# Patient Record
Sex: FEMALE | Race: WHITE | Employment: OTHER | URBAN - NONMETROPOLITAN AREA
[De-identification: names, ages, dates, MRNs, and addresses within clinical notes are randomized per-mention and may not be internally consistent; named-entity substitution may affect disease eponyms.]

---

## 2021-01-27 ENCOUNTER — TELEPHONE (OUTPATIENT)
Dept: FAMILY MEDICINE CLINIC | Facility: CLINIC | Age: 76
End: 2021-01-27

## 2021-01-28 ENCOUNTER — TELEPHONE (OUTPATIENT)
Dept: FAMILY MEDICINE CLINIC | Facility: CLINIC | Age: 76
End: 2021-01-28

## 2021-01-28 NOTE — TELEPHONE ENCOUNTER
Pt son is inquiring about PCS for Ambulance services for pt's appt tomorrow. Pt has been bedridden since Sept. Please call, thanks!

## 2021-01-29 ENCOUNTER — OFFICE VISIT (OUTPATIENT)
Dept: FAMILY MEDICINE CLINIC | Facility: CLINIC | Age: 76
End: 2021-01-29

## 2021-01-29 VITALS
HEART RATE: 104 BPM | OXYGEN SATURATION: 97 % | SYSTOLIC BLOOD PRESSURE: 126 MMHG | DIASTOLIC BLOOD PRESSURE: 84 MMHG | TEMPERATURE: 98.6 F

## 2021-01-29 DIAGNOSIS — M54.50 CHRONIC BILATERAL LOW BACK PAIN WITHOUT SCIATICA: ICD-10-CM

## 2021-01-29 DIAGNOSIS — F41.9 ANXIETY AND DEPRESSION: ICD-10-CM

## 2021-01-29 DIAGNOSIS — G89.29 CHRONIC BILATERAL LOW BACK PAIN WITHOUT SCIATICA: ICD-10-CM

## 2021-01-29 DIAGNOSIS — Z76.89 ENCOUNTER TO ESTABLISH CARE WITH NEW DOCTOR: Primary | ICD-10-CM

## 2021-01-29 DIAGNOSIS — Z74.01 BEDRIDDEN: ICD-10-CM

## 2021-01-29 DIAGNOSIS — F32.A ANXIETY AND DEPRESSION: ICD-10-CM

## 2021-01-29 DIAGNOSIS — G43.709 CHRONIC MIGRAINE WITHOUT AURA WITHOUT STATUS MIGRAINOSUS, NOT INTRACTABLE: ICD-10-CM

## 2021-01-29 DIAGNOSIS — F41.1 ANXIETY STATE: ICD-10-CM

## 2021-01-29 PROBLEM — G43.909 MIGRAINES: Status: ACTIVE | Noted: 2020-09-12

## 2021-01-29 PROBLEM — K21.9 GERD (GASTROESOPHAGEAL REFLUX DISEASE): Status: ACTIVE | Noted: 2020-09-12

## 2021-01-29 PROBLEM — N39.0 URINARY TRACT INFECTION WITHOUT HEMATURIA: Status: ACTIVE | Noted: 2020-09-12

## 2021-01-29 PROBLEM — R78.81 BACTEREMIA: Status: ACTIVE | Noted: 2020-09-15

## 2021-01-29 PROBLEM — I26.99 BILATERAL PULMONARY EMBOLISM (HCC): Status: ACTIVE | Noted: 2020-09-12

## 2021-01-29 PROCEDURE — 99204 OFFICE O/P NEW MOD 45 MIN: CPT | Performed by: FAMILY MEDICINE

## 2021-01-29 RX ORDER — TRAMADOL HYDROCHLORIDE 50 MG/1
50 TABLET ORAL EVERY 8 HOURS PRN
Qty: 30 TABLET | Refills: 0 | Status: SHIPPED | OUTPATIENT
Start: 2021-01-29

## 2021-01-29 RX ORDER — RIZATRIPTAN BENZOATE 10 MG/1
10 TABLET, ORALLY DISINTEGRATING ORAL TAKE AS DIRECTED
Qty: 12 TABLET | Refills: 2 | Status: SHIPPED | OUTPATIENT
Start: 2021-01-29 | End: 2021-04-15

## 2021-02-01 ENCOUNTER — TELEPHONE (OUTPATIENT)
Dept: FAMILY MEDICINE CLINIC | Facility: CLINIC | Age: 76
End: 2021-02-01

## 2021-02-09 DIAGNOSIS — F32.A ANXIETY AND DEPRESSION: Primary | ICD-10-CM

## 2021-02-09 DIAGNOSIS — F41.9 ANXIETY AND DEPRESSION: Primary | ICD-10-CM

## 2021-02-09 RX ORDER — HYDROXYZINE PAMOATE 25 MG/1
25 CAPSULE ORAL DAILY
Qty: 30 CAPSULE | Refills: 2 | Status: SHIPPED | OUTPATIENT
Start: 2021-02-09 | End: 2021-08-19

## 2021-02-09 RX ORDER — ALPRAZOLAM 1 MG/1
1 TABLET ORAL 2 TIMES DAILY PRN
Qty: 60 TABLET | Refills: 0 | Status: SHIPPED | OUTPATIENT
Start: 2021-02-09 | End: 2021-04-01 | Stop reason: SDUPTHER

## 2021-02-09 NOTE — TELEPHONE ENCOUNTER
Last OV   1/29/21    Next Max OV    2/26/2021    Last Script Written 1/9/21      Last Ramón       Please advise on refill    Thank you

## 2021-02-19 ENCOUNTER — TELEPHONE (OUTPATIENT)
Dept: FAMILY MEDICINE CLINIC | Facility: CLINIC | Age: 76
End: 2021-02-19

## 2021-02-20 NOTE — PROGRESS NOTES
Chief Complaint  Establish Care and Back Pain    Subjective          Kellen Hernandez presents to [facility]  Patient is a pleasant 75-year-old  female previously diagnosed with chronic migraines, chronic low back pain and left leg pain that has been bedridden since late last year, GERD, depression, anxiety, history of peptic ulcer disease, history of DVT, history of pulmonary emboli, cataracts, irritable bowel syndrome, and osteoarthritis that presents today to establish care and discuss migraines, back pain, and depression/anxiety.  Patient was transferred to [facility], [place] but definitely due to pain in her back and leg following multiple fracture fracture of her left leg.  She is currently working with physical therapy at home to work on hopefully getting back to ambulation.    Migraine   This is a chronic problem. The current episode started more than 1 year ago. The problem occurs intermittently. The problem has been waxing and waning. The pain is located in the parietal and temporal region. The pain does not radiate. The pain quality is similar to prior headaches. The quality of the pain is described as aching, pulsating, stabbing and throbbing. The pain is at a severity of 10/10. The pain is severe. Associated symptoms include back pain, blurred vision, phonophobia, photophobia, a visual change and weakness. Pertinent negatives include no abdominal pain, abnormal behavior, anorexia, coughing, dizziness, drainage, ear pain, eye pain, eye redness, eye watering, facial sweating, fever, hearing loss, insomnia, loss of balance, muscle aches, nausea, neck pain, numbness, rhinorrhea, scalp tenderness, seizures, sinus pressure, sore throat, swollen glands, tingling, tinnitus, vomiting or weight loss. The symptoms are aggravated by bright light and emotional stress. She has tried triptans, NSAIDs and darkened room (depakene, alprazolam ) for the symptoms. The treatment provided moderate  relief. Her past medical history is significant for migraine headaches.   Back Pain  This is a chronic problem. The current episode started more than 1 year ago. The problem occurs constantly. The problem has been waxing and waning since onset. The pain is present in the lumbar spine, sacro-iliac and gluteal. The quality of the pain is described as aching, shooting and stabbing. The pain does not radiate. The pain is at a severity of 7/10. The pain is severe. The pain is the same all the time. The symptoms are aggravated by position. Associated symptoms include headaches, leg pain and weakness. Pertinent negatives include no abdominal pain, bladder incontinence, bowel incontinence, chest pain, dysuria, fever, numbness, paresis, paresthesias, pelvic pain, perianal numbness, tingling or weight loss. Risk factors include lack of exercise and sedentary lifestyle. She has tried analgesics, NSAIDs, ice, heat and bed rest for the symptoms. The treatment provided moderate relief.   Depression  Visit Type: initial  Onset of symptoms: more than 1 year ago  Progression since onset: unchanged  Patient presents with the following symptoms: anhedonia, depressed mood, fatigue, feelings of hopelessness, feelings of worthlessness, nervousness/anxiety and panic.  Patient is not experiencing: chest pain, choking sensation, compulsions, confusion, decreased concentration, dizziness, dry mouth, excessive worry, hypersomnia, hyperventilation, impotence, insomnia, irritability, malaise, memory impairment, muscle tension, nausea, obsessions, palpitations, psychomotor agitation, psychomotor retardation, restlessness, shortness of breath, suicidal ideas, suicidal planning, thoughts of death, weight gain and weight loss.  Frequency of symptoms: most days   Severity: moderate   Exacerbated by: health issues   Patient has a history of: anxiety/panic attacks and depression  No history of: anemia, arrhythmia, asthma, bipolar disorder, CAD, CHF,  chronic lung disease, fibromyalgia, hyperthyroidism, mental illness and substance abuse  Treatment tried: SSRI, non-benzodiazephine anxiolytics, benzodiazepine and non-SSRI antidepressants  Compliance with treatment: variable  Improvement on treatment: moderate  Past compliance problems: medical issues and medication issues      Anxiety  Presents for initial visit. Onset was more than 5 years ago. The problem has been unchanged. Symptoms include depressed mood, nervous/anxious behavior and panic. Patient reports no chest pain, compulsions, confusion, decreased concentration, dizziness, dry mouth, excessive worry, feeling of choking, hyperventilation, impotence, insomnia, irritability, malaise, muscle tension, nausea, obsessions, palpitations, restlessness, shortness of breath or suicidal ideas. Symptoms occur most days. The severity of symptoms is moderate.     Her past medical history is significant for anxiety/panic attacks and depression. There is no history of arrhythmia, asthma, CAD, chronic lung disease or hyperthyroidism. Past treatments include benzodiazephines, non-SSRI antidepressants, SSRIs and non-benzodiazephine anxiolytics. The treatment provided moderate relief. Compliance with prior treatments has been variable. Prior compliance problems include medical issues.       Review of Systems   Constitutional: Negative for activity change, appetite change, fatigue, fever, irritability, unexpected weight gain and unexpected weight loss.   HENT: Negative for congestion, dental problem, ear pain, hearing loss, rhinorrhea, sinus pressure, sore throat, swollen glands, tinnitus, trouble swallowing and voice change.    Eyes: Positive for blurred vision and photophobia. Negative for pain, redness and visual disturbance.   Respiratory: Negative for cough, choking, chest tightness, shortness of breath and wheezing.    Cardiovascular: Negative for chest pain, palpitations and leg swelling.   Gastrointestinal: Negative  for abdominal distention, abdominal pain, anorexia, blood in stool, bowel incontinence, constipation, diarrhea, nausea and vomiting.   Endocrine: Negative for cold intolerance and heat intolerance.   Genitourinary: Negative for difficulty urinating, dysuria, flank pain, frequency, hematuria, impotence, pelvic pain, urgency, urinary incontinence and vaginal discharge.   Musculoskeletal: Positive for back pain. Negative for arthralgias, gait problem, joint swelling, myalgias and neck pain.   Skin: Negative for rash.   Neurological: Positive for weakness. Negative for dizziness, tingling, seizures, light-headedness, numbness, paresthesias, loss of balance and confusion.   Hematological: Negative for adenopathy. Does not bruise/bleed easily.   Psychiatric/Behavioral: Positive for depressed mood. Negative for agitation, behavioral problems, decreased concentration, dysphoric mood, hallucinations, self-injury, sleep disturbance, substance abuse and suicidal ideas. The patient is nervous/anxious. The patient does not have insomnia and is not hyperactive.      Objective   Vital Signs:   /84   Pulse 104   Temp 98.6 °F (37 °C)   SpO2 97%     Physical Exam  Vitals signs and nursing note reviewed.   Constitutional:       General: She is awake. She is not in acute distress.     Appearance: Normal appearance. She is well-developed, well-groomed and normal weight. She is not ill-appearing, toxic-appearing or diaphoretic.      Interventions: She is not intubated.     Comments: On ambulance stretcher throughout exam.    HENT:      Head: Normocephalic and atraumatic.      Right Ear: Hearing and external ear normal.      Left Ear: Hearing and external ear normal.      Nose: Nose normal.   Eyes:      General: Lids are normal. No scleral icterus.        Right eye: No discharge.         Left eye: No discharge.      Extraocular Movements: Extraocular movements intact.      Right eye: Normal extraocular motion and no nystagmus.       Left eye: Normal extraocular motion and no nystagmus.      Conjunctiva/sclera: Conjunctivae normal.      Pupils: Pupils are equal, round, and reactive to light.   Neck:      Musculoskeletal: Full passive range of motion without pain, normal range of motion and neck supple. Normal range of motion. No edema, erythema or neck rigidity.      Thyroid: No thyroid mass or thyromegaly.      Trachea: Trachea and phonation normal. No tracheal tenderness or tracheal deviation.   Cardiovascular:      Rate and Rhythm: Normal rate and regular rhythm.  No extrasystoles are present.     Chest Wall: PMI is not displaced. No thrill.      Heart sounds: Normal heart sounds, S1 normal and S2 normal. Heart sounds not distant. No murmur. No systolic murmur. No diastolic murmur. No friction rub. No gallop. No S3 or S4 sounds.    Pulmonary:      Effort: Pulmonary effort is normal. No tachypnea, bradypnea, accessory muscle usage, prolonged expiration, respiratory distress or retractions. She is not intubated.      Breath sounds: Normal breath sounds and air entry. No stridor, decreased air movement or transmitted upper airway sounds. No decreased breath sounds, wheezing, rhonchi or rales.   Abdominal:      General: Abdomen is flat. Bowel sounds are normal. There is no distension or abdominal bruit. There are no signs of injury.      Palpations: Abdomen is soft. There is no shifting dullness, fluid wave, hepatomegaly, splenomegaly, mass or pulsatile mass.      Tenderness: There is no abdominal tenderness. There is no right CVA tenderness, left CVA tenderness, guarding or rebound. Negative signs include Chinchilla's sign and McBurney's sign.   Musculoskeletal:      Lumbar back: She exhibits decreased range of motion, tenderness and pain. She exhibits no bony tenderness, no swelling, no edema, no deformity, no laceration, no spasm and normal pulse.      Right lower leg: No edema.      Left lower leg: No edema.      Comments: Leg raise test  difficult to perform due to pain and decreased flexibility.    Lymphadenopathy:      Cervical: No cervical adenopathy.   Skin:     General: Skin is warm and dry.      Findings: No erythema or rash.   Neurological:      General: No focal deficit present.      Mental Status: She is alert and oriented to person, place, and time. Mental status is at baseline.      Cranial Nerves: Cranial nerves are intact. No cranial nerve deficit, dysarthria or facial asymmetry.      Motor: Weakness present. No tremor, atrophy, abnormal muscle tone or seizure activity.      Gait: Gait is intact.      Comments: Decreased strength b/l LE. Able to lift right leg but with some obvious effort. Unable to lift left leg against gravity. Pedal strength intact b/l.    Psychiatric:         Attention and Perception: Attention and perception normal. She is attentive. She does not perceive auditory or visual hallucinations.         Mood and Affect: Mood and affect normal. Mood is not anxious, depressed or elated. Affect is not labile, blunt, flat, angry, tearful or inappropriate.         Speech: Speech normal. She is communicative. Speech is not rapid and pressured, delayed, slurred or tangential.         Behavior: Behavior normal. Behavior is not agitated, slowed, aggressive, withdrawn, hyperactive or combative. Behavior is cooperative.         Thought Content: Thought content normal. Thought content is not paranoid or delusional. Thought content does not include homicidal or suicidal ideation. Thought content does not include homicidal or suicidal plan.         Cognition and Memory: Cognition and memory normal. Cognition is not impaired. Memory is not impaired. She does not exhibit impaired recent memory or impaired remote memory.         Judgment: Judgment normal. Judgment is not impulsive or inappropriate.        Result Review :                 Assessment and Plan    Diagnoses and all orders for this visit:    1. Encounter to establish care with  new doctor (Primary)  Complete medical, surgical, social, and family history reviewed.  Previous medical records requested.      2. Chronic migraine without aura without status migrainosus, not intractable  -     Ambulatory Referral to Neurology  We will continue current medications at this time.  Avoid aggravating factors.  Referral to neurology made at patient's request.    3. Chronic bilateral low back pain without sciatica  -     traMADol (ULTRAM) 50 MG tablet; Take 1 tablet by mouth Every 8 (Eight) Hours As Needed (Moderate/severe pain).  Dispense: 30 tablet; Refill: 0  We will continue tramadol at this time.  Avoid NSAIDs due to history of peptic ulcer disease.  May take Tylenol as well for mild to moderate pain.  Risks of tramadol including sedation, respiratory depression, abuse potential and dependency discussed.  Discussed avoiding this with alprazolam when possible or alcohol or other sedating medications or substances.  Patient verbalized understanding and would like to continue medication as it greatly improves quality of life and alleviate pain making it tolerable.  Ramón reviewed.  Controlled substance contract signed.  UDS will be ordered.  May need referral to pain management in future.  May need further imaging if they do not have this from previous records.  Continue work with physical therapy at this time.    4. Anxiety and depression  -     Ambulatory Referral to Behavioral Health  We will continue current medications at this time.  Discussed that I cannot write benzodiazepines regularly in the office and that she may need to discuss this with psychiatry.  We will continue these at this time until she sees behavioral health.  Risks including sedation, respiratory depression, abuse potential, and dependency discussed.  Patient verbalized understanding and would like to continue medication at this time due to significant relief of anxiety which is one of the triggers of her severe migraines.       5. Anxiety state  -     Ambulatory Referral to Behavioral Health    6. Bedridden  This significantly complicates patient's care as she is unable to get out of the bed for office visits or any ambulation at this time.  Continue to work on physical therapy at this time.    Other orders  -     rizatriptan MLT (MAXALT-MLT) 10 MG disintegrating tablet; Place 1 tablet on the tongue Take As Directed. 1 tablet at onset of headache repeat every 2 hours with a max of 3 per day  Dispense: 12 tablet; Refill: 2      I spent 45 minutes caring for Kellen on this date of service. This time includes time spent by me in the following activities:preparing for the visit, obtaining and/or reviewing a separately obtained history, performing a medically appropriate examination and/or evaluation , counseling and educating the patient/family/caregiver, ordering medications, tests, or procedures, referring and communicating with other health care professionals  and documenting information in the medical record  Follow Up   Return in about 4 weeks (around 2/26/2021) for Recheck, or sooner if any other problems.  Patient was given instructions and counseling regarding her condition or for health maintenance advice. Please see specific information pulled into the AVS if appropriate.

## 2021-02-23 ENCOUNTER — TELEPHONE (OUTPATIENT)
Dept: FAMILY MEDICINE CLINIC | Facility: CLINIC | Age: 76
End: 2021-02-23

## 2021-02-23 DIAGNOSIS — R19.7 DIARRHEA, UNSPECIFIED TYPE: Primary | ICD-10-CM

## 2021-02-23 RX ORDER — DIPHENOXYLATE HYDROCHLORIDE AND ATROPINE SULFATE 2.5; .025 MG/1; MG/1
1 TABLET ORAL 2 TIMES DAILY PRN
Qty: 20 TABLET | Refills: 0 | Status: SHIPPED | OUTPATIENT
Start: 2021-02-23 | End: 2021-07-16

## 2021-03-29 ENCOUNTER — TELEPHONE (OUTPATIENT)
Dept: FAMILY MEDICINE CLINIC | Facility: CLINIC | Age: 76
End: 2021-03-29

## 2021-03-30 DIAGNOSIS — L97.929 ULCERS OF BOTH LOWER EXTREMITIES, UNSPECIFIED ULCER STAGE (HCC): Primary | ICD-10-CM

## 2021-03-30 DIAGNOSIS — L97.919 ULCERS OF BOTH LOWER EXTREMITIES, UNSPECIFIED ULCER STAGE (HCC): Primary | ICD-10-CM

## 2021-04-01 ENCOUNTER — TELEMEDICINE (OUTPATIENT)
Dept: FAMILY MEDICINE CLINIC | Facility: CLINIC | Age: 76
End: 2021-04-01

## 2021-04-01 VITALS — HEIGHT: 64 IN | BODY MASS INDEX: 28.17 KG/M2 | WEIGHT: 165 LBS

## 2021-04-01 DIAGNOSIS — F32.A ANXIETY AND DEPRESSION: ICD-10-CM

## 2021-04-01 DIAGNOSIS — R23.8 BLISTERS OF MULTIPLE SITES: ICD-10-CM

## 2021-04-01 DIAGNOSIS — T07.XXXA WOUNDS, MULTIPLE: Primary | ICD-10-CM

## 2021-04-01 DIAGNOSIS — L29.9 PRURITUS: ICD-10-CM

## 2021-04-01 DIAGNOSIS — F41.9 ANXIETY AND DEPRESSION: ICD-10-CM

## 2021-04-01 PROCEDURE — 99214 OFFICE O/P EST MOD 30 MIN: CPT | Performed by: FAMILY MEDICINE

## 2021-04-01 RX ORDER — HYDROCHLOROTHIAZIDE 25 MG/1
25 TABLET ORAL 2 TIMES DAILY
Qty: 60 TABLET | Refills: 2 | Status: SHIPPED | OUTPATIENT
Start: 2021-04-01 | End: 2021-04-23

## 2021-04-01 RX ORDER — ALPRAZOLAM 1 MG/1
1 TABLET ORAL 2 TIMES DAILY PRN
Qty: 60 TABLET | Refills: 0 | Status: SHIPPED | OUTPATIENT
Start: 2021-04-01 | End: 2021-05-03

## 2021-04-01 RX ORDER — PREDNISONE 10 MG/1
TABLET ORAL
Qty: 48 TABLET | Refills: 0 | Status: SHIPPED | OUTPATIENT
Start: 2021-04-01

## 2021-04-01 NOTE — PROGRESS NOTES
Chief Complaint  Skin Lesion    Aman Hernandez presents to [facility]  Patient is a pleasant 75-year-old  female that is currently bedridden due to history of femur injury in 2020 that presents today via video visit to discuss blisterlike lesions and wounds that have started to appear over the last 1 to 2 months.  Lesions initially present as blisters that are very itchy and will then slough off leaving sores and wounds.  She has these all over her body including her face.  They are sparing her scalp.  She denies similar lesions in the past.  She has not taken anything for this.  Denies any new exposures or possible insect bites.  She was on antibiotics in December 2020 but has not been on any new medications since that time.  He denies any other symptoms including fevers, chills, joint pains, joint swelling, skin discoloration, cough, sore throat, or other symptoms at this time.  She does report that there is some clear drainage from blisters especially after they slough off.    Rash  This is a new problem. The current episode started more than 1 month ago. The problem has been gradually worsening since onset. The rash is diffuse. The rash is characterized by blistering and itchiness. She was exposed to nothing. Pertinent negatives include no anorexia, congestion, cough, diarrhea, eye pain, facial edema, fatigue, fever, joint pain, nail changes, rhinorrhea, shortness of breath, sore throat or vomiting. Past treatments include anti-itch cream. The treatment provided no relief.   Anxiety  Presents for follow-up visit. Symptoms include excessive worry and nervous/anxious behavior. Patient reports no chest pain, compulsions, confusion, decreased concentration, depressed mood, dizziness, dry mouth, feeling of choking, hyperventilation, impotence, insomnia, irritability, malaise, muscle tension, nausea, obsessions, palpitations, panic, restlessness, shortness of  "breath or suicidal ideas. Symptoms occur most days. The severity of symptoms is moderate. The quality of sleep is good.     Treatment side effects: none        Objective   Vital Signs:   Ht 162.6 cm (64\")   Wt 74.8 kg (165 lb)   BMI 28.32 kg/m²     Physical Exam  Constitutional:       General: She is awake. She is not in acute distress.     Appearance: Normal appearance. She is well-groomed. She is not ill-appearing, toxic-appearing or diaphoretic.   HENT:      Head: Normocephalic and atraumatic.      Right Ear: Hearing and external ear normal.      Left Ear: Hearing and external ear normal.      Nose: Nose normal.      Mouth/Throat:      Lips: Pink. No lesions.   Eyes:      General: Lids are normal.      Extraocular Movements: Extraocular movements intact.      Conjunctiva/sclera: Conjunctivae normal.   Pulmonary:      Effort: No respiratory distress.   Musculoskeletal:      Cervical back: Full passive range of motion without pain.   Skin:     Comments: Patient does have obvious sores with different degrees of scabbing scattered on legs and arms.  1 lesion seen on left cheek.  She does have 1 blisterlike lesion that is approximately dime sized on left anterior leg.  No major erythema or drainage appreciated although image is somewhat poor quality due to poor video connection.   Neurological:      General: No focal deficit present.      Mental Status: She is alert and oriented to person, place, and time. Mental status is at baseline.   Psychiatric:         Attention and Perception: Attention and perception normal.         Mood and Affect: Mood and affect normal.         Speech: Speech normal.         Behavior: Behavior normal. Behavior is cooperative.         Thought Content: Thought content normal.         Cognition and Memory: Cognition and memory normal.         Judgment: Judgment normal.        Result Review :                 Assessment and Plan    Diagnoses and all orders for this visit:    1. Wounds, " multiple (Primary)  -     predniSONE (DELTASONE) 10 MG (48) dose pack; Take as directed on pack.  Dispense: 48 tablet; Refill: 0  -     Ambulatory Referral to Home Health    2. Blisters of multiple sites  -     predniSONE (DELTASONE) 10 MG (48) dose pack; Take as directed on pack.  Dispense: 48 tablet; Refill: 0  -     Ambulatory Referral to Home Health    3. Pruritus  -     predniSONE (DELTASONE) 10 MG (48) dose pack; Take as directed on pack.  Dispense: 48 tablet; Refill: 0    4. Anxiety and depression  -     ALPRAZolam (XANAX) 1 MG tablet; Take 1 tablet by mouth 2 (Two) Times a Day As Needed for Anxiety. Takes 1/2 to 1 twice a day  Dispense: 60 tablet; Refill: 0    Other orders  -     hydroCHLOROthiazide (HYDRODIURIL) 25 MG tablet; Take 1 tablet by mouth 2 (Two) Times a Day.  Dispense: 60 tablet; Refill: 2      You have chosen to receive care through a telehealth visit.  Do you consent to use a video/audio connection for your medical care today? Yes    I spent 25 minutes caring for Kellen on this date of service. This time includes time spent by me in the following activities:preparing for the visit, obtaining and/or reviewing a separately obtained history, performing a medically appropriate examination and/or evaluation , counseling and educating the patient/family/caregiver, ordering medications, tests, or procedures, referring and communicating with other health care professionals  and documenting information in the medical record  Follow Up   Return in about 4 weeks (around 4/29/2021) for Recheck lesions , or sooner if any other problems.  Patient was given instructions and counseling regarding her condition or for health maintenance advice. Please see specific information pulled into the AVS if appropriate.

## 2021-04-08 DIAGNOSIS — Z74.01 BEDRIDDEN: Primary | ICD-10-CM

## 2021-04-08 DIAGNOSIS — S72.8X2S OTHER CLOSED FRACTURE OF LEFT FEMUR, UNSPECIFIED PORTION OF FEMUR, SEQUELA: ICD-10-CM

## 2021-04-15 DIAGNOSIS — G43.709 CHRONIC MIGRAINE WITHOUT AURA WITHOUT STATUS MIGRAINOSUS, NOT INTRACTABLE: Primary | ICD-10-CM

## 2021-04-15 RX ORDER — RIZATRIPTAN BENZOATE 10 MG/1
TABLET, ORALLY DISINTEGRATING ORAL
Qty: 12 TABLET | Refills: 2 | Status: SHIPPED | OUTPATIENT
Start: 2021-04-15 | End: 2021-07-14 | Stop reason: SDUPTHER

## 2021-04-23 RX ORDER — TRAZODONE HYDROCHLORIDE 50 MG/1
TABLET ORAL
Qty: 90 TABLET | Refills: 2 | Status: SHIPPED | OUTPATIENT
Start: 2021-04-23

## 2021-04-23 RX ORDER — HYDROCHLOROTHIAZIDE 25 MG/1
TABLET ORAL
Qty: 60 TABLET | Refills: 2 | Status: SHIPPED | OUTPATIENT
Start: 2021-04-23

## 2021-05-03 DIAGNOSIS — F41.9 ANXIETY AND DEPRESSION: ICD-10-CM

## 2021-05-03 DIAGNOSIS — F32.A ANXIETY AND DEPRESSION: ICD-10-CM

## 2021-05-03 RX ORDER — ALPRAZOLAM 1 MG/1
TABLET ORAL
Qty: 60 TABLET | Refills: 0 | Status: SHIPPED | OUTPATIENT
Start: 2021-05-03 | End: 2021-06-10

## 2021-05-03 NOTE — TELEPHONE ENCOUNTER
Last OV   4/1/21    Next Max OV    5/4/2021    Last Script Written 4/1/21      Last Ramón       Please advise on refill    Thank you

## 2021-05-24 ENCOUNTER — TELEPHONE (OUTPATIENT)
Dept: FAMILY MEDICINE CLINIC | Facility: CLINIC | Age: 76
End: 2021-05-24

## 2021-05-24 NOTE — TELEPHONE ENCOUNTER
Jenny with [facility] called to let you know that she has Mrs. Hernandez to switch back to her original laundry detergent and she no longer has any sores and has been release from home care services as of today 5.24.21

## 2021-06-03 ENCOUNTER — TELEPHONE (OUTPATIENT)
Dept: FAMILY MEDICINE CLINIC | Facility: CLINIC | Age: 76
End: 2021-06-03

## 2021-06-03 NOTE — TELEPHONE ENCOUNTER
Pt would like to know if Warfarin would be a good substitute for the Eliquis due to the cost. Pt would like that or different type of medication sent [facility] on [address] in [place]. Please call if questions. Thanks!

## 2021-06-10 DIAGNOSIS — F41.9 ANXIETY AND DEPRESSION: ICD-10-CM

## 2021-06-10 DIAGNOSIS — F32.A ANXIETY AND DEPRESSION: ICD-10-CM

## 2021-06-10 RX ORDER — ALPRAZOLAM 1 MG/1
TABLET ORAL
Qty: 60 TABLET | Refills: 0 | Status: SHIPPED | OUTPATIENT
Start: 2021-06-10 | End: 2021-07-19 | Stop reason: SDUPTHER

## 2021-07-01 NOTE — TELEPHONE ENCOUNTER
Last OV   4/1/21    Next [facility] OV    Visit date not found    Last Script Written 12/10/20      Last Ramón       Please advise on refill    Thank you

## 2021-07-05 RX ORDER — PANTOPRAZOLE SODIUM 40 MG/1
TABLET, DELAYED RELEASE ORAL
Qty: 90 TABLET | Refills: 3 | Status: SHIPPED | OUTPATIENT
Start: 2021-07-05

## 2021-07-12 DIAGNOSIS — F32.A ANXIETY AND DEPRESSION: ICD-10-CM

## 2021-07-12 DIAGNOSIS — F41.9 ANXIETY AND DEPRESSION: ICD-10-CM

## 2021-07-12 RX ORDER — ALPRAZOLAM 1 MG/1
.5-1 TABLET ORAL 2 TIMES DAILY PRN
Qty: 60 TABLET | Refills: 0 | OUTPATIENT
Start: 2021-07-12

## 2021-07-12 NOTE — TELEPHONE ENCOUNTER
Patient was called and told that her Alprazolam could not be refilled until a video visit was made. Patient has scheduled an appointment for 7/19/21

## 2021-07-14 DIAGNOSIS — G43.709 CHRONIC MIGRAINE WITHOUT AURA WITHOUT STATUS MIGRAINOSUS, NOT INTRACTABLE: ICD-10-CM

## 2021-07-14 RX ORDER — RIZATRIPTAN BENZOATE 10 MG/1
TABLET, ORALLY DISINTEGRATING ORAL
Qty: 12 TABLET | Refills: 2 | Status: SHIPPED | OUTPATIENT
Start: 2021-07-14

## 2021-07-14 NOTE — TELEPHONE ENCOUNTER
Last OV   4/1/21    Next Max OV    7/19/2021    Last Script Written 4/15/21      Last Ramón       Please advise on refill    Thank you

## 2021-07-15 DIAGNOSIS — R19.7 DIARRHEA, UNSPECIFIED TYPE: ICD-10-CM

## 2021-07-15 NOTE — TELEPHONE ENCOUNTER
Last OV   4/1/21    Next Max OV    7/19/2021    Last Script Written 2/23/21      Last Ramón       Please advise on refill    Thank you

## 2021-07-16 RX ORDER — DIPHENOXYLATE HYDROCHLORIDE AND ATROPINE SULFATE 2.5; .025 MG/1; MG/1
TABLET ORAL
Qty: 20 TABLET | Refills: 0 | Status: SHIPPED | OUTPATIENT
Start: 2021-07-16 | End: 2021-07-19 | Stop reason: SDUPTHER

## 2021-07-19 ENCOUNTER — TELEMEDICINE (OUTPATIENT)
Dept: FAMILY MEDICINE CLINIC | Facility: CLINIC | Age: 76
End: 2021-07-19

## 2021-07-19 VITALS — BODY MASS INDEX: 28.32 KG/M2 | HEIGHT: 64 IN

## 2021-07-19 DIAGNOSIS — F41.9 ANXIETY AND DEPRESSION: Primary | ICD-10-CM

## 2021-07-19 DIAGNOSIS — K21.9 GASTROESOPHAGEAL REFLUX DISEASE, UNSPECIFIED WHETHER ESOPHAGITIS PRESENT: ICD-10-CM

## 2021-07-19 DIAGNOSIS — F32.A ANXIETY AND DEPRESSION: Primary | ICD-10-CM

## 2021-07-19 DIAGNOSIS — G89.29 CHRONIC PAIN OF LEFT LOWER EXTREMITY: ICD-10-CM

## 2021-07-19 DIAGNOSIS — Z86.718 HISTORY OF DVT (DEEP VEIN THROMBOSIS): ICD-10-CM

## 2021-07-19 DIAGNOSIS — R19.7 DIARRHEA, UNSPECIFIED TYPE: ICD-10-CM

## 2021-07-19 DIAGNOSIS — G43.709 CHRONIC MIGRAINE WITHOUT AURA WITHOUT STATUS MIGRAINOSUS, NOT INTRACTABLE: ICD-10-CM

## 2021-07-19 DIAGNOSIS — Z79.899 LONG-TERM USE OF HIGH-RISK MEDICATION: ICD-10-CM

## 2021-07-19 DIAGNOSIS — M79.605 CHRONIC PAIN OF LEFT LOWER EXTREMITY: ICD-10-CM

## 2021-07-19 PROCEDURE — 99214 OFFICE O/P EST MOD 30 MIN: CPT | Performed by: FAMILY MEDICINE

## 2021-07-19 RX ORDER — GALCANEZUMAB 120 MG/ML
240 INJECTION, SOLUTION SUBCUTANEOUS ONCE
Qty: 2 ML | Refills: 0 | Status: SHIPPED | OUTPATIENT
Start: 2021-07-19 | End: 2021-07-19

## 2021-07-19 RX ORDER — DIPHENOXYLATE HYDROCHLORIDE AND ATROPINE SULFATE 2.5; .025 MG/1; MG/1
1 TABLET ORAL 2 TIMES DAILY PRN
Qty: 20 TABLET | Refills: 2 | Status: SHIPPED | OUTPATIENT
Start: 2021-07-19

## 2021-07-19 RX ORDER — ALPRAZOLAM 1 MG/1
.5-1 TABLET ORAL 2 TIMES DAILY PRN
Qty: 60 TABLET | Refills: 0 | Status: SHIPPED | OUTPATIENT
Start: 2021-07-19 | End: 2021-08-19

## 2021-07-26 ENCOUNTER — HOME HEALTH ADMISSION (OUTPATIENT)
Dept: HOME HEALTH SERVICES | Facility: HOME HEALTHCARE | Age: 76
End: 2021-07-26

## 2021-07-27 ENCOUNTER — TELEPHONE (OUTPATIENT)
Dept: FAMILY MEDICINE CLINIC | Facility: CLINIC | Age: 76
End: 2021-07-27

## 2021-08-16 DIAGNOSIS — R53.81 PHYSICAL DECONDITIONING: ICD-10-CM

## 2021-08-16 DIAGNOSIS — M79.605 CHRONIC PAIN OF LEFT LOWER EXTREMITY: ICD-10-CM

## 2021-08-16 DIAGNOSIS — S72.8X2S OTHER CLOSED FRACTURE OF LEFT FEMUR, UNSPECIFIED PORTION OF FEMUR, SEQUELA: Primary | ICD-10-CM

## 2021-08-16 DIAGNOSIS — G89.29 CHRONIC PAIN OF LEFT LOWER EXTREMITY: ICD-10-CM

## 2021-08-16 DIAGNOSIS — Z74.01 BEDRIDDEN: ICD-10-CM

## 2021-08-19 DIAGNOSIS — F32.A ANXIETY AND DEPRESSION: ICD-10-CM

## 2021-08-19 DIAGNOSIS — F41.9 ANXIETY AND DEPRESSION: ICD-10-CM

## 2021-08-19 RX ORDER — ALPRAZOLAM 1 MG/1
TABLET ORAL
Qty: 60 TABLET | Refills: 0 | Status: SHIPPED | OUTPATIENT
Start: 2021-08-19

## 2021-08-19 RX ORDER — HYDROXYZINE PAMOATE 25 MG/1
25 CAPSULE ORAL DAILY
Qty: 30 CAPSULE | Refills: 2 | Status: SHIPPED | OUTPATIENT
Start: 2021-08-19

## 2021-08-19 NOTE — TELEPHONE ENCOUNTER
Last OV   7/19/21    Next Max OV    Visit date not found    Last Script Written 7/19/21      Last Ramón       Please advise on refill    Thank you

## 2021-08-20 ENCOUNTER — TELEPHONE (OUTPATIENT)
Dept: FAMILY MEDICINE CLINIC | Facility: CLINIC | Age: 76
End: 2021-08-20

## 2021-08-20 NOTE — TELEPHONE ENCOUNTER
TRIAMCINOLONE OINTMENT 0.5%    PLEASE CALL INTO [facility] IN [place] ON [facility]      [phone]

## 2021-09-22 DIAGNOSIS — F32.A ANXIETY AND DEPRESSION: ICD-10-CM

## 2021-09-22 DIAGNOSIS — F41.9 ANXIETY AND DEPRESSION: ICD-10-CM

## 2021-09-22 RX ORDER — ALPRAZOLAM 1 MG/1
TABLET ORAL
Qty: 60 TABLET | Refills: 0 | OUTPATIENT
Start: 2021-09-22

## 2021-10-21 DIAGNOSIS — G43.709 CHRONIC MIGRAINE WITHOUT AURA WITHOUT STATUS MIGRAINOSUS, NOT INTRACTABLE: ICD-10-CM

## 2021-10-21 RX ORDER — RIZATRIPTAN BENZOATE 10 MG/1
TABLET, ORALLY DISINTEGRATING ORAL
Qty: 12 TABLET | Refills: 2 | OUTPATIENT
Start: 2021-10-21